# Patient Record
Sex: MALE | Race: OTHER | NOT HISPANIC OR LATINO
[De-identification: names, ages, dates, MRNs, and addresses within clinical notes are randomized per-mention and may not be internally consistent; named-entity substitution may affect disease eponyms.]

---

## 2023-06-27 ENCOUNTER — APPOINTMENT (OUTPATIENT)
Dept: UROLOGY | Facility: CLINIC | Age: 73
End: 2023-06-27
Payer: MEDICARE

## 2023-06-27 ENCOUNTER — RESULT REVIEW (OUTPATIENT)
Age: 73
End: 2023-06-27

## 2023-06-27 ENCOUNTER — LABORATORY RESULT (OUTPATIENT)
Age: 73
End: 2023-06-27

## 2023-06-27 VITALS
WEIGHT: 193 LBS | BODY MASS INDEX: 31.02 KG/M2 | DIASTOLIC BLOOD PRESSURE: 85 MMHG | SYSTOLIC BLOOD PRESSURE: 126 MMHG | RESPIRATION RATE: 16 BRPM | HEIGHT: 66 IN | HEART RATE: 91 BPM

## 2023-06-27 DIAGNOSIS — Q62.31 CONGENITAL URETEROCELE, ORTHOTOPIC: ICD-10-CM

## 2023-06-27 DIAGNOSIS — N39.0 URINARY TRACT INFECTION, SITE NOT SPECIFIED: ICD-10-CM

## 2023-06-27 PROCEDURE — 99213 OFFICE O/P EST LOW 20 MIN: CPT

## 2023-06-27 NOTE — ASSESSMENT
[FreeTextEntry1] : 72yo M with known L obstructing ureterocele with associated hydro in the L upper moiety found to have a recent UTI in the setting of incomplete bladder emptying (PVR 200cc)\par \par Plan\par - 5mg daily cialis for BPH and incomplete emptying\par - Prostate ultrasound to evaluate size\par - Possible finasteride at next visit if patient still not emptying completely

## 2023-06-27 NOTE — HISTORY OF PRESENT ILLNESS
[FreeTextEntry1] : Patient has a known ureterocele with hydronephrosis in the Left upper moeity 2/2 obstruction. Patient has not followed for ~10 years but now coming in because had recent hospitalization for UTI. Patient states he had no systemic symptoms including fevers, flank pain, and N/V. Only symptoms he had were dysuria, frequency, urgency, and microscopic hematuria. Patient denies urinary symptoms at baseline including frequency, urgency, incomplete emptying, and hesitancy. Does endorse weak stream nocturia (x1). PVR today 210cc.

## 2023-06-27 NOTE — PHYSICAL EXAM
[General Appearance - Well Developed] : well developed [General Appearance - Well Nourished] : well nourished [Normal Appearance] : normal appearance [Abdomen Soft] : soft [Abdomen Tenderness] : non-tender [No Prostate Nodules] : no prostate nodules [Prostate Size ___ gm] : prostate size [unfilled] gm [Skin Color & Pigmentation] : normal skin color and pigmentation [] : no respiratory distress [Respiration, Rhythm And Depth] : normal respiratory rhythm and effort [Oriented To Time, Place, And Person] : oriented to person, place, and time [Affect] : the affect was normal

## 2023-06-28 LAB
APPEARANCE: CLEAR
BILIRUBIN URINE: NEGATIVE
BLOOD URINE: NEGATIVE
COLOR: YELLOW
GLUCOSE QUALITATIVE U: NEGATIVE MG/DL
KETONES URINE: NEGATIVE MG/DL
LEUKOCYTE ESTERASE URINE: ABNORMAL
NITRITE URINE: NEGATIVE
PH URINE: 7
PROTEIN URINE: NEGATIVE MG/DL
SPECIFIC GRAVITY URINE: 1.01
UROBILINOGEN URINE: 0.2 MG/DL

## 2023-06-29 LAB — BACTERIA UR CULT: NORMAL

## 2023-07-19 ENCOUNTER — APPOINTMENT (OUTPATIENT)
Dept: OTOLARYNGOLOGY | Facility: CLINIC | Age: 73
End: 2023-07-19
Payer: MEDICARE

## 2023-07-19 VITALS — BODY MASS INDEX: 32.14 KG/M2 | TEMPERATURE: 96.5 F | WEIGHT: 200 LBS | HEIGHT: 66 IN

## 2023-07-19 DIAGNOSIS — H90.A22 SENSORINEURAL HEARING LOSS, UNILATERAL, LEFT EAR, WITH RESTRICTED HEARING ON THE CONTRALATERAL SIDE: ICD-10-CM

## 2023-07-19 DIAGNOSIS — H90.3 SENSORINEURAL HEARING LOSS, BILATERAL: ICD-10-CM

## 2023-07-19 DIAGNOSIS — R42 DIZZINESS AND GIDDINESS: ICD-10-CM

## 2023-07-19 DIAGNOSIS — R26.89 OTHER ABNORMALITIES OF GAIT AND MOBILITY: ICD-10-CM

## 2023-07-19 PROCEDURE — 99204 OFFICE O/P NEW MOD 45 MIN: CPT

## 2023-07-19 PROCEDURE — 92557 COMPREHENSIVE HEARING TEST: CPT

## 2023-07-19 PROCEDURE — 92567 TYMPANOMETRY: CPT

## 2023-07-19 NOTE — HISTORY OF PRESENT ILLNESS
[de-identified] : c/o problems with balance - started 50 years ago.  Occ has headaches - feels off balance and problems hearing.  Problem worse with bending.   (recently eval  for UTI - seeing urologist).  Has been helped with meclizine in past.   No blackout or diplopia \par Was advised to wear 2 hearing aides but only wears left aide. \par Patient currently getting balance therapy

## 2023-07-19 NOTE — ASSESSMENT
[FreeTextEntry1] : Patient with problems with balance.  No definite spinning - also has hearing issues.   Exam unremarkable - has bilat snhl worse in left ear.  Advised continue wearing both hearing aides and will also get MRI due to asymmetry (patient states no prior imaging) - also will get vng - if all negative would recommend neuro eval.  followup after vng (approx one month

## 2023-07-19 NOTE — REVIEW OF SYSTEMS
[Hearing Loss] : hearing loss [Dizziness] : dizziness [Lightheadedness] : lightheadedness [Negative] : Heme/Lymph [FreeTextEntry7] : nausea  [FreeTextEntry1] : headaches

## 2023-07-19 NOTE — DATA REVIEWED
[de-identified] : Lang barrier may have affected results\par Type A AU\par mild to severe SNHL in the right ear\par moderate to severe SNHL in the left ear\par Asymmetry noted

## 2023-07-31 ENCOUNTER — NON-APPOINTMENT (OUTPATIENT)
Age: 73
End: 2023-07-31

## 2023-07-31 RX ORDER — TADALAFIL 5 MG/1
5 TABLET ORAL
Qty: 90 | Refills: 3 | Status: DISCONTINUED | COMMUNITY
Start: 2023-06-27 | End: 2023-07-31

## 2023-08-02 ENCOUNTER — APPOINTMENT (OUTPATIENT)
Dept: OTOLARYNGOLOGY | Facility: CLINIC | Age: 73
End: 2023-08-02

## 2023-08-15 ENCOUNTER — APPOINTMENT (OUTPATIENT)
Dept: MRI IMAGING | Facility: CLINIC | Age: 73
End: 2023-08-15
Payer: MEDICARE

## 2023-08-15 ENCOUNTER — OUTPATIENT (OUTPATIENT)
Dept: OUTPATIENT SERVICES | Facility: HOSPITAL | Age: 73
LOS: 1 days | End: 2023-08-15
Payer: MEDICARE

## 2023-08-15 DIAGNOSIS — H90.3 SENSORINEURAL HEARING LOSS, BILATERAL: ICD-10-CM

## 2023-08-15 PROCEDURE — A9585: CPT

## 2023-08-15 PROCEDURE — 70553 MRI BRAIN STEM W/O & W/DYE: CPT | Mod: 26,MH

## 2023-08-15 PROCEDURE — 70553 MRI BRAIN STEM W/O & W/DYE: CPT

## 2023-08-17 ENCOUNTER — NON-APPOINTMENT (OUTPATIENT)
Age: 73
End: 2023-08-17

## 2023-09-12 ENCOUNTER — OUTPATIENT (OUTPATIENT)
Dept: OUTPATIENT SERVICES | Facility: HOSPITAL | Age: 73
LOS: 1 days | End: 2023-09-12
Payer: MEDICARE

## 2023-09-12 ENCOUNTER — APPOINTMENT (OUTPATIENT)
Dept: ULTRASOUND IMAGING | Facility: IMAGING CENTER | Age: 73
End: 2023-09-12
Payer: MEDICARE

## 2023-09-12 DIAGNOSIS — N40.1 BENIGN PROSTATIC HYPERPLASIA WITH LOWER URINARY TRACT SYMPTOMS: ICD-10-CM

## 2023-09-12 PROCEDURE — 76857 US EXAM PELVIC LIMITED: CPT | Mod: 26

## 2023-09-12 PROCEDURE — 76857 US EXAM PELVIC LIMITED: CPT

## 2023-09-19 ENCOUNTER — APPOINTMENT (OUTPATIENT)
Dept: UROLOGY | Facility: CLINIC | Age: 73
End: 2023-09-19
Payer: MEDICARE

## 2023-09-19 DIAGNOSIS — N40.1 BENIGN PROSTATIC HYPERPLASIA WITH LOWER URINARY TRACT SYMPMS: ICD-10-CM

## 2023-09-19 PROCEDURE — 99213 OFFICE O/P EST LOW 20 MIN: CPT

## 2023-09-19 RX ORDER — TAMSULOSIN HYDROCHLORIDE 0.4 MG/1
0.4 CAPSULE ORAL
Qty: 90 | Refills: 3 | Status: ACTIVE | COMMUNITY
Start: 2023-09-19 | End: 1900-01-01

## 2024-03-19 ENCOUNTER — APPOINTMENT (OUTPATIENT)
Dept: UROLOGY | Facility: CLINIC | Age: 74
End: 2024-03-19
Payer: MEDICARE

## 2024-03-19 VITALS
SYSTOLIC BLOOD PRESSURE: 132 MMHG | HEART RATE: 80 BPM | RESPIRATION RATE: 17 BRPM | WEIGHT: 200 LBS | DIASTOLIC BLOOD PRESSURE: 80 MMHG | HEIGHT: 60 IN | BODY MASS INDEX: 39.27 KG/M2 | TEMPERATURE: 97.8 F

## 2024-03-19 DIAGNOSIS — N40.1 BENIGN PROSTATIC HYPERPLASIA WITH LOWER URINARY TRACT SYMPMS: ICD-10-CM

## 2024-03-19 DIAGNOSIS — N13.30 UNSPECIFIED HYDRONEPHROSIS: ICD-10-CM

## 2024-03-19 DIAGNOSIS — N28.1 CYST OF KIDNEY, ACQUIRED: ICD-10-CM

## 2024-03-19 DIAGNOSIS — N13.8 BENIGN PROSTATIC HYPERPLASIA WITH LOWER URINARY TRACT SYMPMS: ICD-10-CM

## 2024-03-19 PROCEDURE — 51798 US URINE CAPACITY MEASURE: CPT

## 2024-03-19 PROCEDURE — G2211 COMPLEX E/M VISIT ADD ON: CPT

## 2024-03-19 PROCEDURE — 99214 OFFICE O/P EST MOD 30 MIN: CPT

## 2024-03-19 RX ORDER — TAMSULOSIN HYDROCHLORIDE 0.4 MG/1
0.4 CAPSULE ORAL
Qty: 90 | Refills: 3 | Status: ACTIVE | COMMUNITY
Start: 2023-07-31 | End: 1900-01-01

## 2024-03-19 NOTE — ASSESSMENT
[FreeTextEntry1] : He could not tolerate daily Cialis so switched back to tamsulosin  PVE is 122cc improved  No imaging done  Needs an ultrasound of his kidneys to evaluate ureterocele  Ultrasound ordered

## 2024-03-19 NOTE — HISTORY OF PRESENT ILLNESS
[None] : no symptoms [FreeTextEntry1] : Seen in September for UTI in June  Images reveal a duplicated system  with ureterocele  Elevated PVR  155 in september

## 2024-03-27 ENCOUNTER — APPOINTMENT (OUTPATIENT)
Dept: ULTRASOUND IMAGING | Facility: CLINIC | Age: 74
End: 2024-03-27
Payer: MEDICARE

## 2024-03-27 ENCOUNTER — OUTPATIENT (OUTPATIENT)
Dept: OUTPATIENT SERVICES | Facility: HOSPITAL | Age: 74
LOS: 1 days | End: 2024-03-27
Payer: MEDICARE

## 2024-03-27 DIAGNOSIS — Q62.31 CONGENITAL URETEROCELE, ORTHOTOPIC: ICD-10-CM

## 2024-03-27 PROCEDURE — 76770 US EXAM ABDO BACK WALL COMP: CPT | Mod: 26

## 2024-03-27 PROCEDURE — 76770 US EXAM ABDO BACK WALL COMP: CPT

## 2024-09-05 ENCOUNTER — APPOINTMENT (OUTPATIENT)
Dept: UROLOGY | Facility: CLINIC | Age: 74
End: 2024-09-05
Payer: MEDICARE

## 2024-09-05 ENCOUNTER — OUTPATIENT (OUTPATIENT)
Dept: OUTPATIENT SERVICES | Facility: HOSPITAL | Age: 74
LOS: 1 days | End: 2024-09-05
Payer: MEDICARE

## 2024-09-05 DIAGNOSIS — N28.1 CYST OF KIDNEY, ACQUIRED: ICD-10-CM

## 2024-09-05 DIAGNOSIS — R35.0 FREQUENCY OF MICTURITION: ICD-10-CM

## 2024-09-05 DIAGNOSIS — N13.30 UNSPECIFIED HYDRONEPHROSIS: ICD-10-CM

## 2024-09-05 DIAGNOSIS — N40.1 BENIGN PROSTATIC HYPERPLASIA WITH LOWER URINARY TRACT SYMPMS: ICD-10-CM

## 2024-09-05 PROCEDURE — 99213 OFFICE O/P EST LOW 20 MIN: CPT

## 2024-09-05 PROCEDURE — 76775 US EXAM ABDO BACK WALL LIM: CPT | Mod: 26

## 2024-09-05 PROCEDURE — G2211 COMPLEX E/M VISIT ADD ON: CPT

## 2024-09-05 PROCEDURE — 76775 US EXAM ABDO BACK WALL LIM: CPT

## 2024-09-05 NOTE — HISTORY OF PRESENT ILLNESS
[FreeTextEntry1] : pt with known obstructed ureterocele.  no complaints.  on flowmax  t\he takes everyother day due to sexual side effects. will follow up us in one year.   Subjective: - Summary : Mr. Chalino Ulrich is a 70-year-old gentleman with a history of a duplicated collecting system and a known left-obstructed ureterocele. He reports taking Flomax (tamsulosin) every other day due to some difficulty emptying his bladder at night. He denies any recent urinary tract infections, pain, or other concerning symptoms. - Chief Complaint (CC) : Follow-up visit for obstructed duplicated collecting system - History of Present Illness (HPI) : - Known left-obstructed ureterocele  - Previously had a post-void residual (PVR) of 155 cc a few months ago  - Currently taking Flomax (tamsulosin) every other day  - Reports some difficulty emptying bladder at night, but no pain or infections  - Past Medical History : Duplicated collecting system, left-obstructed ureterocele - Past Surgical History : None mentioned - Family History : Not discussed - Social History : - Country of origin: [place]  - Speaks Indonesian  - Lives with a partner who assists with his care  - Review of Systems : Not discussed in detail - Medications : Flomax (tamsulosin) - Allergies : Not discussed Objective: - Diagnostic Results : - Renal ultrasound today showed a post-void residual of 47 cc  - Dilated ureter noted, stable from previous imaging  - Cysts on contralateral kidney  - Vital Signs : Not mentioned - Physical Examination (PE) : Not discussed Assessment: - Summary : Mr. Wyman has a known duplicated collecting system with a left-obstructed ureterocele. His condition appears stable, with improved bladder emptying on Flomax (tamsulosin) compared to his previous visit. - Problems : - Duplicated collecting system  - Left-obstructed ureterocele  - Differential Diagnosis : Not applicable, as the diagnosis is established Plan: - Summary : The plan is to continue Flomax (tamsulosin) every other day to aid with bladder emptying and avoid urinary tract infections. Alternative treatments for sexual dysfunction were discussed but not pursued due to lack of efficacy or concerns about side effects. Close monitoring and follow-up in one year with a renal ultrasound are recommended. - Plan : - Continue Flomax (tamsulosin) every other day  - Avoid changing medication regimen for now, as it is helping with bladder emptying  - Discussed alternative treatments for sexual dysfunction (e.g., daily Cialis) but patient declined due to lack of efficacy or concerns about side effects  - Follow-up with renal ultrasound in one year  - Advise patient to call if any concerning symptoms arise (e.g., pain, fever, discolored urine) before the next scheduled visit

## 2024-09-06 DIAGNOSIS — N28.1 CYST OF KIDNEY, ACQUIRED: ICD-10-CM

## 2024-09-06 DIAGNOSIS — N13.30 UNSPECIFIED HYDRONEPHROSIS: ICD-10-CM

## 2024-09-06 DIAGNOSIS — N40.1 BENIGN PROSTATIC HYPERPLASIA WITH LOWER URINARY TRACT SYMPTOMS: ICD-10-CM

## 2025-09-09 ENCOUNTER — APPOINTMENT (OUTPATIENT)
Dept: UROLOGY | Facility: CLINIC | Age: 75
End: 2025-09-09
Payer: MEDICARE

## 2025-09-09 VITALS
TEMPERATURE: 97.7 F | SYSTOLIC BLOOD PRESSURE: 137 MMHG | DIASTOLIC BLOOD PRESSURE: 78 MMHG | HEIGHT: 60 IN | WEIGHT: 200 LBS | RESPIRATION RATE: 17 BRPM | HEART RATE: 61 BPM | BODY MASS INDEX: 39.27 KG/M2

## 2025-09-09 DIAGNOSIS — Q62.31 CONGENITAL URETEROCELE, ORTHOTOPIC: ICD-10-CM

## 2025-09-09 PROCEDURE — 99214 OFFICE O/P EST MOD 30 MIN: CPT

## 2025-09-09 PROCEDURE — 76775 US EXAM ABDO BACK WALL LIM: CPT | Mod: 26

## 2025-09-09 PROCEDURE — G2211 COMPLEX E/M VISIT ADD ON: CPT
